# Patient Record
Sex: FEMALE
[De-identification: names, ages, dates, MRNs, and addresses within clinical notes are randomized per-mention and may not be internally consistent; named-entity substitution may affect disease eponyms.]

---

## 2022-09-14 ENCOUNTER — NON-APPOINTMENT (OUTPATIENT)
Age: 27
End: 2022-09-14

## 2022-10-04 PROBLEM — Z00.00 ENCOUNTER FOR PREVENTIVE HEALTH EXAMINATION: Status: ACTIVE | Noted: 2022-10-04

## 2022-10-25 ENCOUNTER — APPOINTMENT (OUTPATIENT)
Dept: ORTHOPEDIC SURGERY | Facility: CLINIC | Age: 27
End: 2022-10-25

## 2022-10-25 VITALS — HEIGHT: 64 IN | WEIGHT: 160 LBS | BODY MASS INDEX: 27.31 KG/M2

## 2022-10-25 DIAGNOSIS — Z78.9 OTHER SPECIFIED HEALTH STATUS: ICD-10-CM

## 2022-10-25 DIAGNOSIS — M94.262 CHONDROMALACIA, LEFT KNEE: ICD-10-CM

## 2022-10-25 DIAGNOSIS — M94.261 CHONDROMALACIA, RIGHT KNEE: ICD-10-CM

## 2022-10-25 PROCEDURE — 99213 OFFICE O/P EST LOW 20 MIN: CPT

## 2022-10-25 PROCEDURE — 73564 X-RAY EXAM KNEE 4 OR MORE: CPT | Mod: 50

## 2022-10-25 RX ORDER — DICLOFENAC SODIUM 20 MG/G
2 SOLUTION TOPICAL
Qty: 1 | Refills: 3 | Status: ACTIVE | COMMUNITY
Start: 2022-10-25 | End: 1900-01-01

## 2022-10-25 NOTE — IMAGING
[de-identified] : Right Knee: Inspection of the knee is as follows: no effusion, erythema, ecchymosis, scars or deformities. Palpation of the knee is as follows: patella tendon tenderness, tibial tubercle tenderness and crepitus about the patella. Knee Range of Motion is as follows: Anterior pain with flexion. Strength examination of the knee is as follows: Quadriceps strength is 5/5 Hamstring strength is 5/5 Ligament Stability and Special Test ligamentously stable. negative McMurrays test. Neurological examination of the knee is as follows: light touch is intact throughout. Gait and function is as follows: patient ambulates without assistive device. [Bilateral] : knee bilaterally [All Views] : anteroposterior, lateral, skyline, and anteroposterior standing [There are no fractures, subluxations or dislocations. No significant abnormalities are seen] : There are no fractures, subluxations or dislocations. No significant abnormalities are seen

## 2022-10-25 NOTE — HISTORY OF PRESENT ILLNESS
[Gradual] : gradual [7] : 7 [0] : 0 [Dull/Aching] : dull/aching [Sharp] : sharp [Constant] : constant [Rest] : rest [Nothing helps with pain getting better] : Nothing helps with pain getting better [] : no [FreeTextEntry1] : BILATERAL KNEES [FreeTextEntry5] : PT STATES SHE SAW BEVERLY IN THE PREVIOUS YEAR AND WAS TOLD TO STOP DOING CERTAIN PHYSICAL Activities AND STOPPED FOR A WHILE AND STARTED AGAIN THEN PAIN CAME BACK  [de-identified] : MOVEMENT [de-identified] : [place]

## 2022-10-25 NOTE — ASSESSMENT
[FreeTextEntry1] : 27 year F WITH MODERATE B/L KNEE PAIN. STARTED DOING KICKBOXING AGAIN RECENTLY WITH INCREASED PAIN. PAIN WORSENS WITH STAIRS AND WALKING PROLONGED DISTANCES, SQUATTING, KNEELING. PAIN IS AFFECTING ADL AND FUNCTIONAL ACTIVITIES. XRAYS REVIEWED AS NORMAL. TREATMENT OPTIONS REVIEWED. [facility].